# Patient Record
Sex: FEMALE | Race: WHITE | Employment: UNEMPLOYED | URBAN - METROPOLITAN AREA
[De-identification: names, ages, dates, MRNs, and addresses within clinical notes are randomized per-mention and may not be internally consistent; named-entity substitution may affect disease eponyms.]

---

## 2021-10-09 ENCOUNTER — HOSPITAL ENCOUNTER (EMERGENCY)
Age: 36
Discharge: PSYCHIATRIC OR SUBSTANCE USE FACILITY | End: 2021-10-10

## 2021-10-09 ENCOUNTER — APPOINTMENT (OUTPATIENT)
Dept: CT IMAGING | Age: 36
End: 2021-10-09

## 2021-10-09 DIAGNOSIS — R45.851 SUICIDAL THOUGHTS: Primary | ICD-10-CM

## 2021-10-09 DIAGNOSIS — R44.0 AUDITORY HALLUCINATIONS: ICD-10-CM

## 2021-10-09 DIAGNOSIS — F15.10 AMPHETAMINE ABUSE (HCC): ICD-10-CM

## 2021-10-09 LAB
A/G RATIO: 1.1 (ref 1.1–2.2)
ACETAMINOPHEN LEVEL: <5 UG/ML (ref 10–30)
ALBUMIN SERPL-MCNC: 4.3 G/DL (ref 3.4–5)
ALP BLD-CCNC: 66 U/L (ref 40–129)
ALT SERPL-CCNC: 8 U/L (ref 10–40)
AMPHETAMINE SCREEN, URINE: POSITIVE
ANION GAP SERPL CALCULATED.3IONS-SCNC: 11 MMOL/L (ref 3–16)
AST SERPL-CCNC: 10 U/L (ref 15–37)
BARBITURATE SCREEN URINE: ABNORMAL
BASOPHILS ABSOLUTE: 0.1 K/UL (ref 0–0.2)
BASOPHILS RELATIVE PERCENT: 0.8 %
BENZODIAZEPINE SCREEN, URINE: ABNORMAL
BILIRUB SERPL-MCNC: <0.2 MG/DL (ref 0–1)
BUN BLDV-MCNC: 13 MG/DL (ref 7–20)
CALCIUM SERPL-MCNC: 9.2 MG/DL (ref 8.3–10.6)
CANNABINOID SCREEN URINE: ABNORMAL
CHLORIDE BLD-SCNC: 103 MMOL/L (ref 99–110)
CO2: 23 MMOL/L (ref 21–32)
COCAINE METABOLITE SCREEN URINE: ABNORMAL
CREAT SERPL-MCNC: 0.7 MG/DL (ref 0.6–1.1)
EOSINOPHILS ABSOLUTE: 0.3 K/UL (ref 0–0.6)
EOSINOPHILS RELATIVE PERCENT: 2.9 %
ETHANOL: NORMAL MG/DL (ref 0–0.08)
GFR AFRICAN AMERICAN: >60
GFR NON-AFRICAN AMERICAN: >60
GLOBULIN: 3.8 G/DL
GLUCOSE BLD-MCNC: 100 MG/DL (ref 70–99)
HCG QUALITATIVE: NEGATIVE
HCT VFR BLD CALC: 39.4 % (ref 36–48)
HEMOGLOBIN: 13.2 G/DL (ref 12–16)
LYMPHOCYTES ABSOLUTE: 2.9 K/UL (ref 1–5.1)
LYMPHOCYTES RELATIVE PERCENT: 28.9 %
Lab: ABNORMAL
MCH RBC QN AUTO: 27.3 PG (ref 26–34)
MCHC RBC AUTO-ENTMCNC: 33.6 G/DL (ref 31–36)
MCV RBC AUTO: 81.3 FL (ref 80–100)
METHADONE SCREEN, URINE: ABNORMAL
MONOCYTES ABSOLUTE: 0.7 K/UL (ref 0–1.3)
MONOCYTES RELATIVE PERCENT: 7.1 %
NEUTROPHILS ABSOLUTE: 6.2 K/UL (ref 1.7–7.7)
NEUTROPHILS RELATIVE PERCENT: 60.3 %
OPIATE SCREEN URINE: ABNORMAL
OXYCODONE URINE: ABNORMAL
PDW BLD-RTO: 15.7 % (ref 12.4–15.4)
PH UA: 6
PHENCYCLIDINE SCREEN URINE: ABNORMAL
PLATELET # BLD: 279 K/UL (ref 135–450)
PMV BLD AUTO: 8.1 FL (ref 5–10.5)
POTASSIUM REFLEX MAGNESIUM: 4 MMOL/L (ref 3.5–5.1)
PROPOXYPHENE SCREEN: ABNORMAL
RBC # BLD: 4.84 M/UL (ref 4–5.2)
SALICYLATE, SERUM: <0.3 MG/DL (ref 15–30)
SARS-COV-2, NAAT: NOT DETECTED
SODIUM BLD-SCNC: 137 MMOL/L (ref 136–145)
TOTAL PROTEIN: 8.1 G/DL (ref 6.4–8.2)
WBC # BLD: 10.2 K/UL (ref 4–11)

## 2021-10-09 PROCEDURE — 80143 DRUG ASSAY ACETAMINOPHEN: CPT

## 2021-10-09 PROCEDURE — 80053 COMPREHEN METABOLIC PANEL: CPT

## 2021-10-09 PROCEDURE — 85025 COMPLETE CBC W/AUTO DIFF WBC: CPT

## 2021-10-09 PROCEDURE — 87635 SARS-COV-2 COVID-19 AMP PRB: CPT

## 2021-10-09 PROCEDURE — 70450 CT HEAD/BRAIN W/O DYE: CPT

## 2021-10-09 PROCEDURE — 99284 EMERGENCY DEPT VISIT MOD MDM: CPT

## 2021-10-09 PROCEDURE — 84703 CHORIONIC GONADOTROPIN ASSAY: CPT

## 2021-10-09 PROCEDURE — 80179 DRUG ASSAY SALICYLATE: CPT

## 2021-10-09 PROCEDURE — 80307 DRUG TEST PRSMV CHEM ANLYZR: CPT

## 2021-10-09 PROCEDURE — 82077 ASSAY SPEC XCP UR&BREATH IA: CPT

## 2021-10-09 PROCEDURE — 6370000000 HC RX 637 (ALT 250 FOR IP): Performed by: PHYSICIAN ASSISTANT

## 2021-10-09 RX ORDER — OLANZAPINE 5 MG/1
5 TABLET, ORALLY DISINTEGRATING ORAL NIGHTLY
Status: DISCONTINUED | OUTPATIENT
Start: 2021-10-09 | End: 2021-10-10 | Stop reason: HOSPADM

## 2021-10-09 RX ADMIN — OLANZAPINE 5 MG: 5 TABLET, ORALLY DISINTEGRATING ORAL at 21:20

## 2021-10-09 NOTE — ED NOTES
Pt brought back to the [facility]. Very tearful. Changed into safety gown and resting in room 3 at this time. All personal items placed in locker.      Elsy Weber RN  10/09/21 1359

## 2021-10-09 NOTE — ED TRIAGE NOTES
Chief Complaint   Patient presents with    Psychiatric Evaluation     pt states she is hearing voices. pt states it is because people are messing with her.  states it has been going on for 3 weeks.

## 2021-10-09 NOTE — ED NOTES
Collateral Contact:  Name: Aleisha Robin  Phone: no cell phone   Relation to Patient: Mom  Last Contact with Patient: Valery Romano pt to hospital   Concerns: Aleisha Robin states that three weeks ago her daughter started hearing voices and that she paranoid. Pt believes that her house is bugged and that there are cameras watching her. She is hearing voices, constantly. Aleisha Robin says that when the voices become too much for her she will begin to respond. She yelled in the car today, \"I'll cut my jugular if you don't shut up! \". Pt reports to Tabby that the voices are people from her past that have wronged her. Aleisha Robin does not believe her daughter is using drugs but reports that she had a problem with heroin 10 years ago. Pt just recently got  in September to a man she has been with for a long time. Aleisha Robin states that he is not very supportive. Pt has no outpatient services at this time and is not taking any medication. She does not have a psych hx. She does not have any previous attempts but used to self harm and self harmed again last Friday.             GEORGIA Hodgson  10/09/21 2017

## 2021-10-10 VITALS
OXYGEN SATURATION: 99 % | WEIGHT: 145 LBS | TEMPERATURE: 98 F | HEART RATE: 80 BPM | RESPIRATION RATE: 17 BRPM | BODY MASS INDEX: 24.13 KG/M2 | SYSTOLIC BLOOD PRESSURE: 120 MMHG | DIASTOLIC BLOOD PRESSURE: 84 MMHG

## 2021-10-10 NOTE — ED NOTES
Access Center with [address] for transfer to [facility] at [facility]  10/10/21 6776

## 2021-10-10 NOTE — ED NOTES
Presenting Problem:Patient presents to [facility] voluntarily after hearing voices and making suicidal statements. Patient was placed on a SOB. She states that the voices have gotten so bad that she is having thoughts of wanting to kill herself by cutting her jugular. Patient admits to Amphetamine use but states that isn't why she is hearing the voices. Patient continues to endorse wanting to hurt or kill herself. Patient was clinically sober at the time of the evaluation. Appearance/Hygiene:  hospital attire, lying in bed, fair grooming and fair hygiene   Motor Behavior: WNL   Attitude: cooperative  Affect: depressed affect   Speech: soft  Mood: depressed and sad, tearful  Thought Processes: [place]  Perceptions: Present - states that she is hearing voices   Thought content:  WNL  Orientation: A&Ox4   Memory: intact  Concentration: Fair    Insight/ judgement: impaired judgment and impaired insight      Psychosocial and contextual factors: Patient lives with her  who she states is not supportive of her    C-SSRS flowsheet is not Complete.     Psychiatric History (including current outpatient provider and past inpatient admissions): Denies    Access to Firearms: Denies    ASSESSMENT FOR IMMINENT FUTURE DANGER:    RISK FACTORS:    []  Age <25 or >49   []  Male gender   [x]  Depressed mood   [x]  Active suicidal ideation   [x]  Suicide plan   []  Suicide attempt   [x]  Access to lethal means   [x]  Prior suicide attempt   [x]  Active substance abuse (if yes pleases add details Amphetamines)   [x]  Highly impulsive behaviors   []  Not attending to self-care/ADLs    []  Recent significant loss   []  Chronic pain or medical illness   []  Social isolation   []  History of violence (if yes please elaborate )   [x]  Active psychosis   []  Cognitive impairment    [x]  No outpatient services in place   []  Medication noncompliance   []  No collateral information to support safety  [] Self- injurious/ Self-harm behavior    PROTECTIVE FACTORS:  [x] Age >25 and <55  [x] Female gender   [] Denies depression  [] Denies suicidal ideation  [] Does not have lethal plan   [] Does not have access to guns or weapons  [] Patient is verbally stiven for safety  [x] No prior suicide attempts  [] No active substance abuse  [x] Patient has social or family support  [] No active psychosis or cognitive dysfunction  [] Physically healthy  [] Has outpatient services in place  [] Compliant with recommended medications  [] Collateral information from  supports patient safety   [] Patient is future oriented with plans to              [facility], RN  10/09/21 9039 [address]

## 2021-10-10 NOTE — ED PROVIDER NOTES
[address] ED  EMERGENCY DEPARTMENT ENCOUNTER        Pt Name: Osman Salgado  MRN: [id number]  [place] 1985  Date of evaluation: 10/9/2021  Provider: Walden Lanes, PA  PCP: Bri Yusuf PA-C    This patient was not seen and evaluated by the attending physician No att. providers found. CHIEF COMPLAINT       Chief Complaint   Patient presents with    Psychiatric Evaluation     pt states she is hearing voices. pt states it is because people are messing with her.  states it has been going on for 3 weeks. HISTORY OF PRESENT ILLNESS   (Location/Symptom, Timing/Onset, Context/Setting, Quality, Duration, Modifying Factors, Severity)  Note limiting factors. Osman Salgado is a 43 y.o. female who presents from her home for hallucinations. ED Course as of Oct 09 2143   Sat Oct 09, 2021   1954 3 weeks ago started hearing voices. Ths has never happened before. The voices are telling her that she will never see her kid again. They call her names. The voices also sing. She cut her arm yesterday becayse she is so sick of hearing the voices. The last time she cut herself was 15 years ago. She has been dx with depression, not currently on medications, has been on paxil in the past last use was years ago. She denies access to firearms. She has never tried to harm herself other than cutting.     [CS]      ED Course User Index  [CS] Walden Lanes, [place]           Nursing Notes were all reviewed and agreed with or any disagreements were addressed  in the HPI. Pt was seen during the [place] 19 pandemic. Appropriate PPE worn by ME during patient encounters. Pt seen during a time with constrained hospital bed capacity and other potential inpatient and outpatient resources were constrained due to the viral pandemic. REVIEW OF SYSTEMS    (2-9 systems for level 4, 10 or more for level 5)     Review of Systems    Positives and Pertinent negatives as per HPI.   Except as noted abovein the ROS, all other systems were reviewed and negative. PAST MEDICAL HISTORY     Past Medical History:   Diagnosis Date    Endometriosis     History of blood transfusion          SURGICAL HISTORY     Past Surgical History:   Procedure Laterality Date    ENDOMETRIAL BIOPSY      OTHER SURGICAL HISTORY  09/21/2015    suction D & C         CURRENTMEDICATIONS       Previous Medications    No medications on file         ALLERGIES     Penicillins and Pertussis vaccines    FAMILYHISTORY     History reviewed. No pertinent family history. SOCIAL HISTORY       Social History     Socioeconomic History    Marital status:      Spouse name: None    Number of children: None    Years of education: None    Highest education level: None   Occupational History    None   Tobacco Use    Smoking status: Current Every Day Smoker     Packs/day: 1.00   Substance and Sexual Activity    Alcohol use: No    Drug use: Not Currently     Types: IV     Comment: heroin IV- couple years sober,since 2011, per pt as of 9/21/15     Sexual activity: Yes     Partners: Male   Other Topics Concern    None   Social History Narrative    None     Social Determinants of Health     Financial Resource Strain:     Difficulty of Paying Living Expenses:    Food Insecurity:     Worried About Running Out of Food in the Last Year:     Ran Out of Food in the Last Year:    Transportation Needs:     Lack of Transportation (Medical):      Lack of Transportation (Non-Medical):    Physical Activity:     Days of Exercise per Week:     Minutes of Exercise per Session:    Stress:     Feeling of Stress :    Social Connections:     Frequency of Communication with Friends and Family:     Frequency of Social Gatherings with Friends and Family:     Attends Caodaism Services:     Active Member of Clubs or Organizations:     Attends Club or Organization Meetings:     Marital Status:    Intimate Partner Violence:     Fear of Current or Ex-Partner:     Emotionally Abused:     Physically Abused:     Sexually Abused:        SCREENINGS             PHYSICAL EXAM    (up to 7 for level 4, 8 or more for level 5)     ED Triage Vitals [10/09/21 1838]   BP Temp Temp Source Pulse Resp SpO2 Height Weight   (!) 140/90 97.2 °F (36.2 °C) Oral 107 18 100 % -- 145 lb (65.8 kg)       Physical Exam  Vitals and nursing note reviewed. Constitutional:       General: She is awake. She is not in acute distress. Appearance: Normal appearance. She is well-developed. She is not ill-appearing, toxic-appearing or diaphoretic. HENT:      Head: Normocephalic and atraumatic. Right Ear: External ear normal.      Left Ear: External ear normal.      Nose: Nose normal.      Mouth/Throat:      Mouth: Mucous membranes are moist.      Pharynx: Oropharynx is clear. Eyes:      General:         Right eye: No discharge. Left eye: No discharge. Extraocular Movements: Extraocular movements intact. Conjunctiva/sclera: Conjunctivae normal.      Pupils: Pupils are equal, round, and reactive to light. Cardiovascular:      Rate and Rhythm: Normal rate and regular rhythm. Pulses: Normal pulses. Heart sounds: Normal heart sounds. No murmur heard. No friction rub. No gallop. Pulmonary:      Effort: Pulmonary effort is normal. No respiratory distress. Breath sounds: Normal breath sounds. No wheezing or rales. Abdominal:      Palpations: Abdomen is soft. Tenderness: There is no abdominal tenderness. Musculoskeletal:      Cervical back: Normal range of motion and neck supple. Skin:     General: Skin is warm and dry. Capillary Refill: Capillary refill takes less than 2 seconds. Findings: No rash. Neurological:      Mental Status: She is alert, oriented to person, place, and time and easily aroused. GCS: GCS eye subscore is 4. GCS verbal subscore is 5. GCS motor subscore is 6. Cranial Nerves:  No dysarthria or facial asymmetry. Sensory: Sensation is intact. Motor: No weakness or tremor. Coordination: Coordination is intact. Psychiatric:         Attention and Perception: She perceives auditory hallucinations. Mood and Affect: Mood is anxious. Affect is flat. Speech: Speech normal.         Behavior: Behavior is cooperative. Thought Content: Thought content includes suicidal ideation. Thought content does not include homicidal ideation.            DIAGNOSTIC RESULTS   LABS:    Labs Reviewed   ACETAMINOPHEN LEVEL - Abnormal; Notable for the following components:       Result Value    Acetaminophen Level <5 (*)     All other components within normal limits    Narrative:     Performed at:  [facility] [address],  [place]   Phone [phone]   CBC WITH AUTO DIFFERENTIAL - Abnormal; Notable for the following components:    RDW 15.7 (*)     All other components within normal limits    Narrative:     Performed at:  [facility] [address],  [place]   Phone [phone]   COMPREHENSIVE METABOLIC PANEL W/ REFLEX TO MG FOR LOW K - Abnormal; Notable for the following components:    Glucose 100 (*)     ALT 8 (*)     AST 10 (*)     All other components within normal limits    Narrative:     Performed at:  [facility] [address],  [place]   Phone [phone]   [address] - Abnormal; Notable for the following components:    Amphetamine Screen, Urine POSITIVE (*)     All other components within normal limits    Narrative:     Performed at:  [facility] [address],  [place], [employer]   Phone [phone]   SALICYLATE LEVEL - Abnormal; Notable for the following components:    Salicylate, Serum <8.1 (*)     All other components within normal limits    Narrative:     Performed at:  [facility] [address],  [place], [facility]   Phone [phone], RAPID    Narrative:     Performed at:  [facility] [address],  [place], [facility]   Phone [phone]   ETHANOL    Narrative:     Performed at:  [facility]) [facility] [address],  [place], [facility]   Phone [phone]   HCG, SERUM, QUALITATIVE    Narrative:     Performed at:  [facility] [address],  [place], [facility]   Phone [phone]       All other labs were within normal range or not returned as of this dictation. EKG: All EKG's are interpreted by the Emergency Department Physician who either signs orCo-signs this chart in the absence of a cardiologist.  Please see their note for interpretation of EKG. RADIOLOGY:   Non-plain film images such as CT, Ultrasound and MRI are read by the radiologist. Plain radiographic images are visualized andpreliminarily interpreted by the  ED Provider with the below findings:        Interpretation [facility] Radiologist below, if available at the time of this note:    CT HEAD WO CONTRAST   Final Result   1. No acute hemorrhage or large intracranial mass-effect. 2. Other findings as described. CT HEAD WO CONTRAST    Result Date: 10/9/2021  EXAMINATION: CT OF THE HEAD WITHOUT CONTRAST  10/9/2021 8:29 pm TECHNIQUE: CT of the head was performed without the administration of intravenous contrast. Dose modulation, iterative reconstruction, and/or weight based adjustment of the mA/kV was utilized to reduce the radiation dose to as low as reasonably achievable. COMPARISON: None. HISTORY: ORDERING SYSTEM PROVIDED HISTORY: hallucinations TECHNOLOGIST PROVIDED HISTORY: If patient is on cardiac monitor and/or pulse ox, they may be taken off cardiac monitor and pulse ox, left on O2 if currently on.  All monitors reattached when patient returns to room. Has a \"code stroke\" or \"stroke alert\" been called? ->No Reason for exam:->hallucinations Decision Support Exception - unselect if not a suspected or confirmed emergency medical condition->Emergency Medical Condition (MA) Is the patient pregnant?->No Reason for Exam: psych eval Acuity: Unknown Type of Exam: Unknown Additional signs and symptoms: headache, hallucinations FINDINGS: BRAIN/VENTRICLES: No acute hemorrhage. Supratentorial gray white differentiation appears maintained given artifact near the skull base. Artifact partially obscures the nimo. Artifact partially obscures the inferior cerebellum. Ventricles are within normal limits in size. There is no midline shift. Basal cisterns appear patent. ORBITS: Visualized orbits appear unremarkable on this unenhanced exam. SINUSES: Mild mucosal thickening of the maxillary sinuses. Visualized mastoid air cells appear clear. SOFT TISSUES/SKULL: No depressed calvarial fracture identified. 1. No acute hemorrhage or large intracranial mass-effect. 2. Other findings as described.           PROCEDURES   Unless otherwise noted below, none     Procedures    CRITICAL CARE TIME   N/A    CONSULTS:  None      EMERGENCY DEPARTMENT COURSE and DIFFERENTIALDIAGNOSIS/MDM:   Vitals:    Vitals:    10/09/21 1838   BP: (!) 140/90   Pulse: 107   Resp: 18   Temp: 97.2 °F (36.2 °C)   TempSrc: Oral   SpO2: 100%   Weight: 145 lb (65.8 kg)       Patient was given thefollowing medications:  Medications   OLANZapine zydis (ZYPREXA) disintegrating tablet 5 mg (5 mg Oral Given 10/9/21 2120)       PDMP Monitoring:    Last PDMP Nonnie Kocher as Reviewed [facility]):  Review User Review Instant Review Result            Urine Drug Screenings (1 yr)     Urine Drug Screen  Collected: 10/9/2021  7:00 PM (Final result)    Narrative: Performed at:  [facility]  [address],  [place], [facility]   Phone [phone]    Complete Results Medication Contract and Consent for Opioid Use Documents Filed      No documents found                MDM:   Patient seen and evaluated. Old records reviewed. Diagnostic testing reviewed and results discussed. I have independently evaluated this patient based upon my scope of practice. Supervising physician was in the department for consultation as needed. 75-year-old female, presents for auditory hallucinations and new thoughts of harming herself. Physical exam grossly unremarkable, she is experiencing auditory hallucinations during the exam and appears anxious. I do of concern for her acute thoughts of wanting to slit her throat. Work-up remarkable for amphetamine abuse. Otherwise unremarkable, CT head negative for mass lesion. I have performed a medical clearance examination on this patient. It is my opinion that no medical conditions were discovered that would preclude admission to a behavioral health unit or discharge home. I feel that the patient is medically stable for disposition by the behavioral health team at this time. Discharge Time out:  CC Reviewed Yes   Test Results Yes     Vitals:    10/09/21 1838   BP: (!) 140/90   Pulse: 107   Resp: 18   Temp: 97.2 °F (36.2 °C)   SpO2: 100%              FINAL IMPRESSION      1. Suicidal thoughts    2. Auditory hallucinations    3. Amphetamine abuse ([facility]          DISPOSITION/PLAN   DISPOSITION        PATIENT REFERREDTO:  No follow-up provider specified.     DISCHARGE MEDICATIONS:  New Prescriptions    No medications on file       DISCONTINUED MEDICATIONS:  Discontinued Medications    ALBUTEROL SULFATE HFA (PROVENTIL HFA) 108 (90 BASE) MCG/ACT INHALER    Inhale 2 puffs into the lungs every 6 hours as needed for Wheezing or Shortness of Breath    AZITHROMYCIN (ZITHROMAX) 250 MG TABLET    2 po day 1, then 1 po days 2-5    IBUPROFEN (ADVIL;MOTRIN) 400 MG TABLET    Take 1 tablet by mouth every 6 hours as needed              (Please note that portions ofthis note were completed with a voice recognition program.  Efforts were made to edit the dictations but occasionally words are mis-transcribed.)    Neyda Sims (electronically signed)        Neyda Sims  10/09/21 1344

## 2021-10-10 NOTE — ED NOTES
Pt continues to rest with her eyes closed, and breathing normal. No distress is noted. Will continue to monitor for pt safety.

## 2021-10-10 NOTE — ED NOTES
Pt appears to be resting peacefully in treatment room. Eyes are closed, respiration normal, no distress is noted. Will continue to monitor for pt safety.

## 2021-10-10 NOTE — ED NOTES
Pt currently resting in room - no signs or symptoms of distress noted     [facility] [place], RN  10/10/21 2167

## 2021-10-10 NOTE — ED NOTES
Pt currently resting in room - no signs or symptoms of distress noted     Jeane See, RN  10/10/21 6266

## 2021-10-10 NOTE — ED NOTES
Pt continues to rest peacefully in treatment room. No distress. Will continue to monitor for pt safety.      Haroon Brady  10/10/21 3923

## 2021-10-10 NOTE — ED NOTES
Pt resting with eyes closed, lying on side, with respiration even, and easy. Will continue to monitor for pt safety.      Barbara Christianson  10/10/21 1516

## 2021-10-10 NOTE — ED NOTES
Level of Care Disposition: Admit - transfer due to no beds  Patient was seen by ED provider and [facility] staff. The case presented to psychiatric provider on-call Dr. Maddi Haskins. Based on the ED evaluation and information presented to the provider by Sary Parkinson RN it is the recommendation of the on call psychiatric provider that inpatient hospitalization is the least restrictive environment for the patient at this time. The patient will be admitted to the inpatient unit.  Admitting provider did not order suicide precautions based on patient stiven for safety while in the hospital.       Beny Hayes RN  10/09/21 6216